# Patient Record
Sex: FEMALE | Race: BLACK OR AFRICAN AMERICAN | NOT HISPANIC OR LATINO | Employment: FULL TIME | URBAN - METROPOLITAN AREA
[De-identification: names, ages, dates, MRNs, and addresses within clinical notes are randomized per-mention and may not be internally consistent; named-entity substitution may affect disease eponyms.]

---

## 2023-03-27 ENCOUNTER — HOSPITAL ENCOUNTER (OUTPATIENT)
Dept: DATA CONVERSION | Facility: HOSPITAL | Age: 58
End: 2023-03-27
Attending: INTERNAL MEDICINE | Admitting: INTERNAL MEDICINE
Payer: COMMERCIAL

## 2023-03-27 DIAGNOSIS — Z12.11 ENCOUNTER FOR SCREENING FOR MALIGNANT NEOPLASM OF COLON: ICD-10-CM

## 2023-03-27 DIAGNOSIS — M25.511 PAIN IN RIGHT SHOULDER: ICD-10-CM

## 2023-03-27 DIAGNOSIS — I10 ESSENTIAL (PRIMARY) HYPERTENSION: ICD-10-CM

## 2023-03-27 DIAGNOSIS — K64.0 FIRST DEGREE HEMORRHOIDS: ICD-10-CM

## 2023-03-27 DIAGNOSIS — Z86.010 PERSONAL HISTORY OF COLONIC POLYPS: ICD-10-CM

## 2023-04-05 LAB
ALBUMIN (G/DL) IN SER/PLAS: 4.5 G/DL (ref 3.4–5)
ANION GAP IN SER/PLAS: 17 MMOL/L (ref 10–20)
CALCIUM (MG/DL) IN SER/PLAS: 10.2 MG/DL (ref 8.6–10.6)
CARBON DIOXIDE, TOTAL (MMOL/L) IN SER/PLAS: 25 MMOL/L (ref 21–32)
CHLORIDE (MMOL/L) IN SER/PLAS: 106 MMOL/L (ref 98–107)
CREATININE (MG/DL) IN SER/PLAS: 0.76 MG/DL (ref 0.5–1.05)
GFR FEMALE: 90 ML/MIN/1.73M2
GLUCOSE (MG/DL) IN SER/PLAS: 75 MG/DL (ref 74–99)
PHOSPHATE (MG/DL) IN SER/PLAS: 4 MG/DL (ref 2.5–4.9)
POTASSIUM (MMOL/L) IN SER/PLAS: 4.7 MMOL/L (ref 3.5–5.3)
SODIUM (MMOL/L) IN SER/PLAS: 143 MMOL/L (ref 136–145)
UREA NITROGEN (MG/DL) IN SER/PLAS: 14 MG/DL (ref 6–23)

## 2023-09-19 ENCOUNTER — OFFICE VISIT (OUTPATIENT)
Dept: PRIMARY CARE | Facility: CLINIC | Age: 58
End: 2023-09-19
Payer: COMMERCIAL

## 2023-09-19 VITALS
DIASTOLIC BLOOD PRESSURE: 98 MMHG | OXYGEN SATURATION: 98 % | WEIGHT: 211.9 LBS | BODY MASS INDEX: 33.19 KG/M2 | TEMPERATURE: 97.8 F | SYSTOLIC BLOOD PRESSURE: 193 MMHG | HEART RATE: 66 BPM

## 2023-09-19 DIAGNOSIS — I10 PRIMARY HYPERTENSION: ICD-10-CM

## 2023-09-19 DIAGNOSIS — G89.29 CHRONIC RIGHT SHOULDER PAIN: Primary | ICD-10-CM

## 2023-09-19 DIAGNOSIS — M25.511 CHRONIC RIGHT SHOULDER PAIN: Primary | ICD-10-CM

## 2023-09-19 DIAGNOSIS — M54.2 NECK PAIN ON RIGHT SIDE: ICD-10-CM

## 2023-09-19 DIAGNOSIS — E78.5 HYPERLIPIDEMIA, UNSPECIFIED HYPERLIPIDEMIA TYPE: ICD-10-CM

## 2023-09-19 PROCEDURE — 99203 OFFICE O/P NEW LOW 30 MIN: CPT | Performed by: STUDENT IN AN ORGANIZED HEALTH CARE EDUCATION/TRAINING PROGRAM

## 2023-09-19 PROCEDURE — 1036F TOBACCO NON-USER: CPT | Performed by: STUDENT IN AN ORGANIZED HEALTH CARE EDUCATION/TRAINING PROGRAM

## 2023-09-19 PROCEDURE — 3077F SYST BP >= 140 MM HG: CPT | Performed by: STUDENT IN AN ORGANIZED HEALTH CARE EDUCATION/TRAINING PROGRAM

## 2023-09-19 PROCEDURE — 3080F DIAST BP >= 90 MM HG: CPT | Performed by: STUDENT IN AN ORGANIZED HEALTH CARE EDUCATION/TRAINING PROGRAM

## 2023-09-19 RX ORDER — LIDOCAINE 50 MG/G
1 PATCH TOPICAL DAILY
Qty: 30 PATCH | Refills: 11 | Status: SHIPPED | OUTPATIENT
Start: 2023-09-19 | End: 2023-09-21 | Stop reason: SDUPTHER

## 2023-09-19 RX ORDER — LISINOPRIL 10 MG/1
10 TABLET ORAL DAILY
COMMUNITY
Start: 2023-06-18 | End: 2023-09-19 | Stop reason: SDUPTHER

## 2023-09-19 RX ORDER — ATORVASTATIN CALCIUM 20 MG/1
20 TABLET, FILM COATED ORAL DAILY
Qty: 90 TABLET | Refills: 3 | Status: SHIPPED | OUTPATIENT
Start: 2023-09-19 | End: 2023-09-21 | Stop reason: SDUPTHER

## 2023-09-19 RX ORDER — LISINOPRIL 10 MG/1
10 TABLET ORAL DAILY
Qty: 90 TABLET | Refills: 3 | Status: SHIPPED | OUTPATIENT
Start: 2023-09-19 | End: 2023-09-21 | Stop reason: SDUPTHER

## 2023-09-19 RX ORDER — CYCLOBENZAPRINE HCL 5 MG
5 TABLET ORAL NIGHTLY PRN
Qty: 30 TABLET | Refills: 0 | Status: SHIPPED | OUTPATIENT
Start: 2023-09-19 | End: 2023-09-21 | Stop reason: SDUPTHER

## 2023-09-19 RX ORDER — ATORVASTATIN CALCIUM 20 MG/1
20 TABLET, FILM COATED ORAL DAILY
COMMUNITY
Start: 2023-02-14 | End: 2023-09-19 | Stop reason: SDUPTHER

## 2023-09-19 NOTE — PATIENT INSTRUCTIONS
Dear Ms. Deng,     It was a pleasure getting to manage your care with you today.     Please check your blood pressure daily over the next week. If your blood pressure is consistently >150 please let us know.    Prescriptions:  We have sent medication prescriptions to your pharmacy on file. Please pick them up at your earliest convenience.     Referral:   We have provided you the below referrals. Please call to schedule referrals if no one has contacted you within 3 days.   1. Physical Therapy    Follow up Appointment: 8 weeks    Emergency  In the case of an emergency please call 911 or visit the Emergency Department immediately for evaluation.     We look forward to continuing your care here at our Clinic. Take Care.     Sincerely,   Tom Maddox MD

## 2023-09-19 NOTE — PROGRESS NOTES
Subjective   Patient ID: Annabel Deng is a 58 y.o. female with a history of HTN and HLD who presents to establish care and for evaluation of Neck Pain.    Having pain in the R shoulder and R neck. Pain was initially in the R shoulder, which is still bothering her, but now the neck is worse. Pain has been going on 2-3 months in the R neck now and feels like a Charley Horse. New pillow didn't help. Trying to do exercise/gentle stretching isn't helping, Tylenol isn't helping, hot and cold compresses isn't helping anymore. Tried ibuprofen as well which didn't help. Has never had muscle relaxants. Pain is more in the lateral neck than the midline. No headaches. No injuries to the neck, no history of back or spine surgeries. 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 No swelling in the arm or neck. Had a tooth removed in June, neck pain was already there when she had the surgery. Had the surgery for a failed filling. No fevers or chills. No weakness in the arm, no numbness or tingling in the hands or fingers, no changes in hand coordination.      Review of Systems   Eyes:  Negative for visual disturbance.   Respiratory:  Negative for shortness of breath.    Cardiovascular:  Negative for chest pain.   Musculoskeletal:  Positive for arthralgias and neck pain.   Neurological:  Negative for weakness, numbness and headaches.     Objective   BP (!) 193/98 (BP Location: Right arm, Patient Position: Sitting, BP Cuff Size: Adult)   Pulse 66   Temp 36.6 °C (97.8 °F) (Temporal)   Wt 96.1 kg (211 lb 14.4 oz)   SpO2 98%   BMI 33.19 kg/m²  Body mass index is 33.19 kg/m².    Physical Exam  Vitals reviewed.   Constitutional:       Appearance: Normal appearance.   HENT:      Head: Normocephalic and atraumatic.   Eyes:      Conjunctiva/sclera: Conjunctivae normal.   Neck:      Comments: Mild pain with flexion, no pain with extension. Significant pain with R lateral flexion, less pain w/ L  lateral flexion. Pain w/ R rotation, less pain w/ L rotation. No midline cervical tenderness. Mild R paracervical tenderness.  Cardiovascular:      Rate and Rhythm: Normal rate and regular rhythm.      Heart sounds: No murmur heard.     No friction rub. No gallop.   Pulmonary:      Effort: Pulmonary effort is normal. No respiratory distress.   Musculoskeletal:      Right shoulder: No swelling or deformity.      Left shoulder: No swelling or deformity.      Comments: No tenderness over the clavicle, AC joint, glenohumeral joint, bicipital groove, or scapula; mild tenderness over proximal biceps.    Full flexion and abduction of the bilateral shoulders, although with pain at peak of motion. Mildly limited internal rotation and pain with lift-off test on the R. Unable to complete empty can test d/t severe shoulder pain on the R with arm positioning. Negative impingement tests. 5/5 symmetric strength with shoulder abduction, elbow flexion and extension, forearm pronation and supination.    2+ radial pulses. Grossly intact sensation in the hands.   Skin:     General: Skin is warm and dry.   Neurological:      Mental Status: She is alert. Mental status is at baseline.   Psychiatric:         Behavior: Behavior normal.       Assessment/Plan   Problem List Items Addressed This Visit       Hypertension     -BP markedly elevated in office in the absence of symptoms  -patient reports BP is typically well controlled at home and that she never sees numbers like this  -refilled lisinopril 10 mg daily  -monitor BP closely at home and call the clinic for persistently elevated BP         Relevant Medications    lisinopril 10 mg tablet    Hyperlipidemia     -reviewed last lipid panel which showed significantly elevated total cholesterol of 233, LDL of 158  -refilled atorvastatin 20 mg daily  -given ASCVD risk of 22.8% consider dose increase at next visit         Relevant Medications    atorvastatin (Lipitor) 20 mg tablet    Shoulder  pain, right - Primary     -given limited internal rotation and inability to complete empty can test I suspect pain is d/t rotator cuff pathology  -shoulder XR to evaluate for alternative etiologies  -PT eval for further evaluation and management  -Tylenol, cyclobenzaprine PRN for pain         Relevant Medications    cyclobenzaprine (Flexeril) 5 mg tablet    lidocaine (Lidoderm) 5 % patch    Other Relevant Orders    XR shoulder right 2+ views (Completed)    Referral to Physical Therapy    Neck pain on right side     -pain appears to be muscular in nature given pain w/ certain neck motions and location of pain  -no distal neurovascular deficits  -no apparent bony pain or tenderness  -continue stretching, Tylenol, hot/warm compresses  -trial of cyclobenzaprine 5 mg TID PRN  -cervical spine x-ray         Relevant Medications    cyclobenzaprine (Flexeril) 5 mg tablet    lidocaine (Lidoderm) 5 % patch    Other Relevant Orders    XR cervical spine complete 4-5 views (Completed)    Referral to Physical Therapy     Follow up in 2 months for reassessment of pain and blood pressure.    Patient seen and discussed with Dr. Narayanan.    Tom Maddox MD   PGY-3  [facility]

## 2023-09-20 PROBLEM — E55.9 VITAMIN D DEFICIENCY: Status: ACTIVE | Noted: 2023-09-20

## 2023-09-20 PROBLEM — E66.9 OBESITY: Status: ACTIVE | Noted: 2023-09-20

## 2023-09-20 PROBLEM — N95.2 ATROPHY OF VAGINA: Status: ACTIVE | Noted: 2023-09-20

## 2023-09-20 ASSESSMENT — ENCOUNTER SYMPTOMS
ARTHRALGIAS: 1
SHORTNESS OF BREATH: 0
WEAKNESS: 0
NECK PAIN: 1
NUMBNESS: 0
HEADACHES: 0

## 2023-09-21 DIAGNOSIS — M54.2 NECK PAIN ON RIGHT SIDE: ICD-10-CM

## 2023-09-21 DIAGNOSIS — I10 PRIMARY HYPERTENSION: ICD-10-CM

## 2023-09-21 DIAGNOSIS — E78.5 HYPERLIPIDEMIA, UNSPECIFIED HYPERLIPIDEMIA TYPE: ICD-10-CM

## 2023-09-21 DIAGNOSIS — M25.511 CHRONIC RIGHT SHOULDER PAIN: ICD-10-CM

## 2023-09-21 DIAGNOSIS — G89.29 CHRONIC RIGHT SHOULDER PAIN: ICD-10-CM

## 2023-09-21 RX ORDER — CYCLOBENZAPRINE HCL 5 MG
5 TABLET ORAL NIGHTLY PRN
Qty: 30 TABLET | Refills: 0 | Status: SHIPPED | OUTPATIENT
Start: 2023-09-21 | End: 2023-09-25 | Stop reason: SDUPTHER

## 2023-09-21 RX ORDER — ATORVASTATIN CALCIUM 20 MG/1
20 TABLET, FILM COATED ORAL DAILY
Qty: 90 TABLET | Refills: 3 | Status: SHIPPED | OUTPATIENT
Start: 2023-09-21 | End: 2024-03-07 | Stop reason: SDUPTHER

## 2023-09-21 RX ORDER — LISINOPRIL 10 MG/1
10 TABLET ORAL DAILY
Qty: 90 TABLET | Refills: 3 | Status: SHIPPED | OUTPATIENT
Start: 2023-09-21 | End: 2024-03-07 | Stop reason: SDUPTHER

## 2023-09-21 RX ORDER — LIDOCAINE 50 MG/G
1 PATCH TOPICAL DAILY
Qty: 30 PATCH | Refills: 11 | Status: SHIPPED | OUTPATIENT
Start: 2023-09-21 | End: 2024-03-07 | Stop reason: SDUPTHER

## 2023-09-21 NOTE — ASSESSMENT & PLAN NOTE
-BP markedly elevated in office in the absence of symptoms  -patient reports BP is typically well controlled at home and that she never sees numbers like this  -refilled lisinopril 10 mg daily  -monitor BP closely at home and call the clinic for persistently elevated BP

## 2023-09-21 NOTE — ASSESSMENT & PLAN NOTE
-pain appears to be muscular in nature given pain w/ certain neck motions and location of pain  -no distal neurovascular deficits  -no apparent bony pain or tenderness  -continue stretching, Tylenol, hot/warm compresses  -trial of cyclobenzaprine 5 mg TID PRN  -lidocaine patches  -cervical spine x-ray

## 2023-09-21 NOTE — ASSESSMENT & PLAN NOTE
-reviewed last lipid panel which showed significantly elevated total cholesterol of 233, LDL of 158  -refilled atorvastatin 20 mg daily  -given ASCVD risk of 22.8% consider dose increase at next visit

## 2023-09-21 NOTE — ASSESSMENT & PLAN NOTE
-given limited internal rotation and inability to complete empty can test I suspect pain is d/t rotator cuff pathology  -shoulder XR to evaluate for alternative etiologies  -PT eval for further evaluation and management  -Tylenol, cyclobenzaprine PRN for pain

## 2023-09-21 NOTE — PROGRESS NOTES
Patient requested rxs sent to [facility] rather than [facility] where previously prescribed.  
No

## 2023-09-25 DIAGNOSIS — M25.511 CHRONIC RIGHT SHOULDER PAIN: ICD-10-CM

## 2023-09-25 DIAGNOSIS — M50.30 DEGENERATIVE DISC DISEASE, CERVICAL: ICD-10-CM

## 2023-09-25 DIAGNOSIS — G89.29 CHRONIC RIGHT SHOULDER PAIN: ICD-10-CM

## 2023-09-25 RX ORDER — CYCLOBENZAPRINE HCL 10 MG
10 TABLET ORAL NIGHTLY PRN
Qty: 30 TABLET | Refills: 0 | Status: SHIPPED | OUTPATIENT
Start: 2023-09-25 | End: 2023-11-24

## 2023-09-25 NOTE — PROGRESS NOTES
XR showed significant degenerative disc disease C5-7. Patient reporting persistent severe neck pain without radicular symptoms. Encouraged follow up with physical therapy. Will trial increased flexeril dose and also refer to pain management.

## 2023-10-17 NOTE — PROGRESS NOTES
I saw and evaluated the patient. I personally obtained the key and critical portions of the history and physical exam or was physically present for key and critical portions performed by the resident/fellow. I reviewed the resident/fellow's documentation and discussed the patient with the resident/fellow. I agree with the resident/fellow's medical decision making as documented in the note.    Charlette Narayanan MD

## 2023-10-19 ENCOUNTER — APPOINTMENT (OUTPATIENT)
Dept: PHYSICAL THERAPY | Facility: HOSPITAL | Age: 58
End: 2023-10-19
Payer: COMMERCIAL

## 2023-11-21 ENCOUNTER — APPOINTMENT (OUTPATIENT)
Dept: PRIMARY CARE | Facility: CLINIC | Age: 58
End: 2023-11-21
Payer: COMMERCIAL

## 2024-03-07 ENCOUNTER — OFFICE VISIT (OUTPATIENT)
Dept: PRIMARY CARE | Facility: HOSPITAL | Age: 59
End: 2024-03-07
Payer: COMMERCIAL

## 2024-03-07 VITALS
BODY MASS INDEX: 32.96 KG/M2 | SYSTOLIC BLOOD PRESSURE: 177 MMHG | OXYGEN SATURATION: 100 % | DIASTOLIC BLOOD PRESSURE: 109 MMHG | HEIGHT: 67 IN | TEMPERATURE: 97.7 F | HEART RATE: 67 BPM | WEIGHT: 210 LBS

## 2024-03-07 DIAGNOSIS — Z00.00 HEALTHCARE MAINTENANCE: ICD-10-CM

## 2024-03-07 DIAGNOSIS — G89.29 CHRONIC RIGHT SHOULDER PAIN: ICD-10-CM

## 2024-03-07 DIAGNOSIS — E78.49 OTHER HYPERLIPIDEMIA: ICD-10-CM

## 2024-03-07 DIAGNOSIS — R73.03 PREDIABETES: ICD-10-CM

## 2024-03-07 DIAGNOSIS — M25.511 CHRONIC RIGHT SHOULDER PAIN: ICD-10-CM

## 2024-03-07 DIAGNOSIS — M47.812 SPONDYLOSIS OF CERVICAL REGION WITHOUT MYELOPATHY OR RADICULOPATHY: Primary | ICD-10-CM

## 2024-03-07 DIAGNOSIS — I10 PRIMARY HYPERTENSION: ICD-10-CM

## 2024-03-07 DIAGNOSIS — M54.2 NECK PAIN ON RIGHT SIDE: ICD-10-CM

## 2024-03-07 DIAGNOSIS — M54.2 NECK PAIN: Primary | ICD-10-CM

## 2024-03-07 DIAGNOSIS — E78.5 HYPERLIPIDEMIA, UNSPECIFIED HYPERLIPIDEMIA TYPE: ICD-10-CM

## 2024-03-07 LAB
ALBUMIN SERPL BCP-MCNC: 4.6 G/DL (ref 3.4–5)
ANION GAP SERPL CALC-SCNC: 14 MMOL/L (ref 10–20)
BUN SERPL-MCNC: 12 MG/DL (ref 6–23)
CALCIUM SERPL-MCNC: 9.8 MG/DL (ref 8.6–10.6)
CHLORIDE SERPL-SCNC: 106 MMOL/L (ref 98–107)
CHOLEST SERPL-MCNC: 222 MG/DL (ref 0–199)
CHOLESTEROL/HDL RATIO: 4.3
CO2 SERPL-SCNC: 26 MMOL/L (ref 21–32)
CREAT SERPL-MCNC: 0.77 MG/DL (ref 0.5–1.05)
EGFRCR SERPLBLD CKD-EPI 2021: 89 ML/MIN/1.73M*2
EST. AVERAGE GLUCOSE BLD GHB EST-MCNC: 114 MG/DL
GLUCOSE SERPL-MCNC: 84 MG/DL (ref 74–99)
HBA1C MFR BLD: 5.6 %
HDLC SERPL-MCNC: 52.2 MG/DL
LDLC SERPL CALC-MCNC: 155 MG/DL
NON HDL CHOLESTEROL: 170 MG/DL (ref 0–149)
PHOSPHATE SERPL-MCNC: 3.8 MG/DL (ref 2.5–4.9)
POTASSIUM SERPL-SCNC: 4.4 MMOL/L (ref 3.5–5.3)
SODIUM SERPL-SCNC: 142 MMOL/L (ref 136–145)
TRIGL SERPL-MCNC: 75 MG/DL (ref 0–149)
VLDL: 15 MG/DL (ref 0–40)

## 2024-03-07 PROCEDURE — 80061 LIPID PANEL: CPT

## 2024-03-07 PROCEDURE — 80069 RENAL FUNCTION PANEL: CPT

## 2024-03-07 PROCEDURE — 3077F SYST BP >= 140 MM HG: CPT

## 2024-03-07 PROCEDURE — 99214 OFFICE O/P EST MOD 30 MIN: CPT | Mod: GC

## 2024-03-07 PROCEDURE — 3080F DIAST BP >= 90 MM HG: CPT

## 2024-03-07 PROCEDURE — 83036 HEMOGLOBIN GLYCOSYLATED A1C: CPT

## 2024-03-07 PROCEDURE — 99214 OFFICE O/P EST MOD 30 MIN: CPT

## 2024-03-07 PROCEDURE — 1036F TOBACCO NON-USER: CPT

## 2024-03-07 PROCEDURE — 36415 COLL VENOUS BLD VENIPUNCTURE: CPT

## 2024-03-07 RX ORDER — LIDOCAINE 50 MG/G
1 PATCH TOPICAL DAILY
Qty: 30 PATCH | Refills: 11 | Status: SHIPPED | OUTPATIENT
Start: 2024-03-07 | End: 2024-03-07 | Stop reason: ALTCHOICE

## 2024-03-07 RX ORDER — ACETAMINOPHEN 500 MG
1000 TABLET ORAL EVERY 6 HOURS PRN
Qty: 90 TABLET | Refills: 3 | Status: SHIPPED | OUTPATIENT
Start: 2024-03-07 | End: 2024-04-21

## 2024-03-07 RX ORDER — ATORVASTATIN CALCIUM 20 MG/1
20 TABLET, FILM COATED ORAL DAILY
Qty: 90 TABLET | Refills: 11 | Status: SHIPPED | OUTPATIENT
Start: 2024-03-07 | End: 2024-03-11 | Stop reason: SDUPTHER

## 2024-03-07 RX ORDER — LIDOCAINE 50 MG/G
1 PATCH TOPICAL DAILY
Qty: 30 PATCH | Refills: 3 | Status: SHIPPED | OUTPATIENT
Start: 2024-03-07 | End: 2025-03-07

## 2024-03-07 RX ORDER — LISINOPRIL 10 MG/1
20 TABLET ORAL DAILY
Qty: 90 TABLET | Refills: 11 | Status: SHIPPED | OUTPATIENT
Start: 2024-03-07 | End: 2024-05-07 | Stop reason: SDUPTHER

## 2024-03-07 ASSESSMENT — ENCOUNTER SYMPTOMS
APPETITE CHANGE: 0
HEMATURIA: 0
CONSTIPATION: 0
JOINT SWELLING: 0
COUGH: 0
DEPRESSION: 0
LIGHT-HEADEDNESS: 0
CHOKING: 0
NECK PAIN: 1
FATIGUE: 0
ABDOMINAL DISTENTION: 0
ARTHRALGIAS: 1
RHINORRHEA: 0
LOSS OF SENSATION IN FEET: 0
FEVER: 0
HEADACHES: 0
BRUISES/BLEEDS EASILY: 0
ACTIVITY CHANGE: 0
OCCASIONAL FEELINGS OF UNSTEADINESS: 0
DIFFICULTY URINATING: 0
SLEEP DISTURBANCE: 0
EYE DISCHARGE: 0
DIARRHEA: 0

## 2024-03-07 ASSESSMENT — PATIENT HEALTH QUESTIONNAIRE - PHQ9
2. FEELING DOWN, DEPRESSED OR HOPELESS: NOT AT ALL
SUM OF ALL RESPONSES TO PHQ9 QUESTIONS 1 AND 2: 0
1. LITTLE INTEREST OR PLEASURE IN DOING THINGS: NOT AT ALL

## 2024-03-07 ASSESSMENT — PAIN SCALES - GENERAL: PAINLEVEL: 0-NO PAIN

## 2024-03-07 NOTE — PROGRESS NOTES
Subjective   Patient ID: Annabel Deng is a 58 y.o. female who presents for No chief complaint on file..  HPI    Last seen [facility] [place], [place] 4/2023  Interval hx:  seen by family med 9/2023 for R neck pain, also on R shoulder, shoulder XR, PT eval, tyenol, cyclobenzaprine 5 mg TID PRN, /98    Radiograhs showing moderate degenerative changes of cervical spine and normal shoulder radiograph, FM later increased Flexeril to 10 mg TID PRN    Still has neck, L knee and L hip and some R shoulder. Neck pain is worst. Thinks it is arthritis. Tried voltaren gel, didn't do anything. Taking Tylenol PM and helps. Maybe twice per week. Nothing else for pain, Rare ibuprofen (2 tabs twice per week). No flexeril or lidocaine patch. Physical therapy. They gave a referral previously.   Neck pain no radiation into arm. No injuries. Has neck brace that helps. No chiropractor but does get massages  9/10 at its worst in neck, hip and knee is 5-6/10. No sciatica. BP high, last took 1 month ago, No CP, blurry vision, headache or blood in urine. Used to have home BP cuff but lost it.   No paresthesias    ROS  No fevers, has eye doctor no blurry vision  No difficulty swallowing  Had tooth pulled, has dentist  No cough, nbo SOB  No CP  No constipation or diarrhea  No urinary issues  Last period 5 years ago, no bleeding  Psych: No stress or anxiety    No ED or urgent care visits        BP - will get cuff for home does not have presently  Diet: eats once per day, gets to work 0500, sleeps by 8 PM, eats dinner 5 PM, air frier, typical meal - salad, wings, vegetables, french fries, likes oreos, drinks some ginger ale, regular sweet tea and water, some coffee, just not hungry, weight has been stable, wants to reduce sugary beverage consumption  Exercise: busy moving, not exercising as regularly, walking is the plan once done moving, stretching  Weight has been stable, pt interested in losing weight    Meds: Lisinopril 10 mg, atorvastatin 20 mg, needs  refills  [facility]          Review of Systems   Constitutional:  Negative for activity change, appetite change, fatigue and fever.   HENT:  Negative for congestion, dental problem and rhinorrhea.    Eyes:  Negative for discharge and visual disturbance.   Respiratory:  Negative for cough and choking.    Cardiovascular:  Negative for chest pain.   Gastrointestinal:  Negative for abdominal distention, constipation and diarrhea.   Endocrine: Negative for polyuria.   Genitourinary:  Negative for decreased urine volume, difficulty urinating and hematuria.   Musculoskeletal:  Positive for arthralgias and neck pain. Negative for joint swelling.   Skin:  Negative for rash.   Neurological:  Negative for light-headedness and headaches.   Hematological:  Does not bruise/bleed easily.   Psychiatric/Behavioral:  Negative for sleep disturbance.          Past Medical History:   Diagnosis Date    Costochondritis 2013    Osteoarthritis of hip, unspecified     Osteoarthritis of hip    Ovarian cyst 2013    Polyp of cervix uteri 2017    Cervical polyp     Past Surgical History:   Procedure Laterality Date     SECTION, CLASSIC  2013     Section    CT ABDOMEN PELVIS ANGIOGRAM W AND/OR WO IV CONTRAST  2016    CT ABDOMEN PELVIS ANGIOGRAM W AND/OR WO IV CONTRAST 2016 [facility] [place] [facility]    TUBAL LIGATION  2013    Tubal Ligation     No family history on file.  Medication Documentation Review Audit       Reviewed by Tom Maddox MD (Resident) on 23 at 1552      Medication Order Taking? Sig Documenting Provider Last Dose Status            No Medications to Display                                 No Known Allergies  Social Determinants of Health     Tobacco Use: Low Risk  (2023)    Patient History     Smoking Tobacco Use: Never     Smokeless Tobacco Use: Never     Passive Exposure: Not on file   Alcohol Use: Not on file   Financial Resource Strain: Not  on file   Food Insecurity: Not on file   Transportation Needs: Not on file   Physical Activity: Not on file   Stress: Not on file   Social Connections: Not on file   Intimate Partner Violence: Not on file   Depression: Not on file   Housing Stability: Not on file   Utilities: Not on file   Digital Equity: Not on file       Objective   There were no vitals taken for this visit.   Physical Exam    Gen: NAD, alert and oriented, resting comfortably  HEENT: PERRL, EOMI, no oral lesions, MMM  Neck: supple, no masses, limited ROM of neck to R, negative spurling  Cardiac: RRR. No murmurs, rubs, or gallops, no JVD  Resp: CTA b/l, no rales or ronchi  Abd: soft, nontender to palpation, no HSM, normal bowel sounds  MSK: Strength and ROM grossly intact b/l, no joint erythema, neg b/l straight leg raise, neg empty can and neg lift off test  Neuro: CN 2-12 intact b/l, sensation to light touch intact  Skin: No rash or bruising  Lymph: No edema  Vascular: Radial and pedal pulses 2+ b/l  Psych: Normal mood and affect        No results found for this or any previous visit (from the past 24 hour(s)).       Assessment/Plan            Annabel Deng 58 F w/ Hx of HTN, DLD, and prediabetes presenting today for follow up. Major concerns today are untreated hypertension and osteoarthritis. Blood pressure severely elevated but no signs of end organ damage as pt ran out of Lisinopril and has not been taking. Will resume anti hypertensive and encourage ambulatory monitoring and DASH diet initiation. Regarding OA, no red flag symptoms. Will recommend high dose tylenol and PT which pt is agreeable to.         #Hypertension stage II  -resume lisinopril and increase to 20 mg QD  -DASH diet counseling continued  -Pt counseled on home BP cuff, plans to commence using  -RFP to assess for kidney complications from sustained HTN     #R shoulder pain, neck pain, L knee pain, L hip pain  #Osteoarthritis  -Likely MSK in nature, radiographs showing  degenerative changes of neck  -Advised high dose Tylenol 1 g Q6 scheduled for 10 days then as needed, lidocaine patch  -PT referral, acupuncture referral, pt has existing pain management referral from FM  -Counseled against NSAID use     #HDL  -last lipid Feb 2023 (t cholesterol 233, HDL 58 and ). ASCVD 11%  -Atorvastatin 20 mg  -Recheck ordered    #Prediabetes  -A1c 5.7 Feb 2023  -recheck ordered        #Health maintenance  -A1c: 5.7 (Feb 2023)  -Lipid: cholesterol 197, HDL 57, ,  2014   -Colonoscopy: 8/2014 one sessile polyp removed â€“ tubular adenoma, internal hemorrhoid, repeat 5-10 years, repeat March 202 normal, no biopsies taken  -Mammogram: Feb 2023 neg, reordered 3/2024  -Pap: March 2023 HPV neg,   -Dental home referral provided  -Immunizations: Covid x2, no booster, no shingles vaccine, no flu vaccine (pt does not like needles and prefers to pass for now), Last TDaP 2017    Staffed with Dr. Smith  Follow up: 6 weeks    Signed,  Tushar Duran MD PGY-2  Internal Medicine-Pediatrics

## 2024-03-07 NOTE — PATIENT INSTRUCTIONS
Dear  Annabel,    Thank you for choosing [facility] for your care needs. It was a pleasure to serve you. Today, we discussed the following:    Blood pressure: Your blood pressure is very high today. It is important that you resume taking your lisinopril 20 mg ASAP. We will call it in to your pharmacy. Please also start using your home blood pressure cuff and check BP 3x per week. Goal BP is less than 130/80. Lowering blood pressure is important to reduce risk heart attack and stroke  Do not take NSAIDS like aleve and motrin as these raise blood pressure. We recommend starting the DASH diet to reduce BP and giving you a hand out    We refilled your medication for high cholesterol Atorvastatin and will recheck cholesterol level and diabetes level and electrolytes    For neck, hip and knee pain this is likely arthritis. To treat we use antiinflammatory medication (start Tylenol 1g every 6 hours (max 4,000 mg per day) scheduled for 10 days as a burst tor educe inflammation. Also sending lidocaine patch to pharmacy. The second way to address this is to strengthen at risk muscle groups with physical therapy. They will teach you the exercises and then you can do them at home. We will also refer you to [facility] medicine as this can help with pain (acupuncture).  You also have a referral from pain management from Family Medicine if you would like to see them.       Health maintenance:  We offered vaccines today but you preferred to wait  Mammogram ordered    Follow up:  6 weeks with Dr Tushar Duran (between May 1 until May 14)    Thank you so much for coming to the clinic today. It was very nice to meet you. If you have any questions please call our office at [phone] to talk to one of our physicians or schedule an appointment     Tushar Duran MD  Internal Medicine/Pediatrics, PGY-2

## 2024-03-07 NOTE — PROGRESS NOTES
I reviewed the resident/fellow's documentation and discussed the patient with the resident/fellow. I agree with the resident/fellow's medical decision making as documented in the note.     Segundo Smith MD MPH

## 2024-03-08 ENCOUNTER — SPECIALTY PHARMACY (OUTPATIENT)
Dept: PHARMACY | Facility: CLINIC | Age: 59
End: 2024-03-08

## 2024-03-11 ENCOUNTER — TELEPHONE (OUTPATIENT)
Dept: PEDIATRICS | Facility: CLINIC | Age: 59
End: 2024-03-11
Payer: COMMERCIAL

## 2024-03-11 DIAGNOSIS — E78.5 HYPERLIPIDEMIA, UNSPECIFIED HYPERLIPIDEMIA TYPE: ICD-10-CM

## 2024-03-11 RX ORDER — ATORVASTATIN CALCIUM 20 MG/1
40 TABLET, FILM COATED ORAL DAILY
Qty: 90 TABLET | Refills: 11 | Status: SHIPPED | OUTPATIENT
Start: 2024-03-11 | End: 2024-05-07 | Stop reason: SDUPTHER

## 2024-03-11 NOTE — TELEPHONE ENCOUNTER
Called pt on lab results. A1c improved to 5.6%. RFP stable. Only mild gains on lipid panel on Atorvastatin 20 mg., LDL above goal.   Asked pt to increase Atorvastatin to 40 mg. Will send refill to pharmacy  Lidocaine patch available at [facility] without copay.    Pt agreed to plan. Will also continue to work on lifestyle changes. No additional concerns      Signed,  Tushar Duran MD PGY-2  Internal Medicine-Pediatrics

## 2024-03-13 ENCOUNTER — HOSPITAL ENCOUNTER (OUTPATIENT)
Dept: RADIOLOGY | Facility: HOSPITAL | Age: 59
Discharge: HOME | End: 2024-03-13
Payer: COMMERCIAL

## 2024-03-13 VITALS — HEIGHT: 67 IN | WEIGHT: 210 LBS | BODY MASS INDEX: 32.96 KG/M2

## 2024-03-13 DIAGNOSIS — Z00.00 HEALTHCARE MAINTENANCE: ICD-10-CM

## 2024-03-13 PROCEDURE — 77067 SCR MAMMO BI INCL CAD: CPT

## 2024-03-13 PROCEDURE — 77063 BREAST TOMOSYNTHESIS BI: CPT | Performed by: RADIOLOGY

## 2024-03-13 PROCEDURE — 77067 SCR MAMMO BI INCL CAD: CPT | Performed by: RADIOLOGY

## 2024-03-18 ENCOUNTER — TELEPHONE (OUTPATIENT)
Dept: PEDIATRICS | Facility: CLINIC | Age: 59
End: 2024-03-18
Payer: COMMERCIAL

## 2024-03-18 NOTE — TELEPHONE ENCOUNTER
Called pt and updated on negative mammogram result. Pt said she already saw the result on Mychart. No questions at this time.    Signed,  Tushar Duran MD PGY-2  Internal Medicine-Pediatrics

## 2024-05-07 ENCOUNTER — OFFICE VISIT (OUTPATIENT)
Dept: PRIMARY CARE | Facility: HOSPITAL | Age: 59
End: 2024-05-07
Payer: COMMERCIAL

## 2024-05-07 VITALS
DIASTOLIC BLOOD PRESSURE: 91 MMHG | TEMPERATURE: 96.6 F | OXYGEN SATURATION: 99 % | HEIGHT: 67 IN | BODY MASS INDEX: 32.49 KG/M2 | HEART RATE: 70 BPM | WEIGHT: 207 LBS | SYSTOLIC BLOOD PRESSURE: 160 MMHG

## 2024-05-07 DIAGNOSIS — E78.5 HYPERLIPIDEMIA, UNSPECIFIED HYPERLIPIDEMIA TYPE: ICD-10-CM

## 2024-05-07 DIAGNOSIS — I10 PRIMARY HYPERTENSION: ICD-10-CM

## 2024-05-07 PROCEDURE — 99214 OFFICE O/P EST MOD 30 MIN: CPT

## 2024-05-07 PROCEDURE — 3080F DIAST BP >= 90 MM HG: CPT

## 2024-05-07 PROCEDURE — 99214 OFFICE O/P EST MOD 30 MIN: CPT | Mod: GC

## 2024-05-07 PROCEDURE — 1036F TOBACCO NON-USER: CPT

## 2024-05-07 PROCEDURE — 3077F SYST BP >= 140 MM HG: CPT

## 2024-05-07 RX ORDER — ATORVASTATIN CALCIUM 20 MG/1
40 TABLET, FILM COATED ORAL DAILY
Qty: 90 TABLET | Refills: 11 | Status: SHIPPED | OUTPATIENT
Start: 2024-05-07

## 2024-05-07 RX ORDER — LISINOPRIL 10 MG/1
40 TABLET ORAL DAILY
Qty: 90 TABLET | Refills: 11 | Status: SHIPPED | OUTPATIENT
Start: 2024-05-07

## 2024-05-07 ASSESSMENT — ENCOUNTER SYMPTOMS
DIARRHEA: 0
FEVER: 0
DIFFICULTY URINATING: 0
EYE DISCHARGE: 0
BACK PAIN: 1
SHORTNESS OF BREATH: 0
COUGH: 0
LOSS OF SENSATION IN FEET: 0
RHINORRHEA: 0
OCCASIONAL FEELINGS OF UNSTEADINESS: 0
DIZZINESS: 0
POLYDIPSIA: 0
BRUISES/BLEEDS EASILY: 0
DEPRESSION: 0
ARTHRALGIAS: 1
ABDOMINAL PAIN: 0

## 2024-05-07 ASSESSMENT — PATIENT HEALTH QUESTIONNAIRE - PHQ9
SUM OF ALL RESPONSES TO PHQ9 QUESTIONS 1 AND 2: 0
2. FEELING DOWN, DEPRESSED OR HOPELESS: NOT AT ALL
1. LITTLE INTEREST OR PLEASURE IN DOING THINGS: NOT AT ALL

## 2024-05-07 ASSESSMENT — PAIN SCALES - GENERAL: PAINLEVEL: 6
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
## 2024-08-06 ENCOUNTER — TELEPHONE (OUTPATIENT)
Dept: PRIMARY CARE | Facility: HOSPITAL | Age: 59
End: 2024-08-06
Payer: COMMERCIAL

## 2024-08-06 NOTE — TELEPHONE ENCOUNTER
Received a box task that pt would like to speak with provider regarding her prescriptions. I tried calling her, unable to reach her. Will forward to next box doctor to follow-up.

## 2024-08-07 NOTE — PROGRESS NOTES
Received in-basket task for patient question about her medications. Called patient to discuss. No answer, left HIPAA compliant VM to call back during business hours.

## 2024-08-08 NOTE — TELEPHONE ENCOUNTER
Received a box task that pt would like to speak with provider regarding her prescriptions. Pt answered but said she was at work and unable to talk and that she would call later.

## 2024-11-05 ENCOUNTER — APPOINTMENT (OUTPATIENT)
Dept: PRIMARY CARE | Facility: HOSPITAL | Age: 59
End: 2024-11-05
Payer: COMMERCIAL

## 2025-04-14 ENCOUNTER — OFFICE VISIT (OUTPATIENT)
Dept: PRIMARY CARE | Facility: HOSPITAL | Age: 60
End: 2025-04-14
Payer: COMMERCIAL

## 2025-04-14 VITALS
HEIGHT: 67 IN | WEIGHT: 205 LBS | DIASTOLIC BLOOD PRESSURE: 95 MMHG | BODY MASS INDEX: 32.18 KG/M2 | SYSTOLIC BLOOD PRESSURE: 175 MMHG | TEMPERATURE: 98.7 F | HEART RATE: 77 BPM | OXYGEN SATURATION: 96 %

## 2025-04-14 DIAGNOSIS — G89.29 CHRONIC RIGHT SHOULDER PAIN: ICD-10-CM

## 2025-04-14 DIAGNOSIS — R40.0 DAYTIME SOMNOLENCE: ICD-10-CM

## 2025-04-14 DIAGNOSIS — I10 PRIMARY HYPERTENSION: ICD-10-CM

## 2025-04-14 DIAGNOSIS — Z00.00 HEALTHCARE MAINTENANCE: ICD-10-CM

## 2025-04-14 DIAGNOSIS — E78.5 HYPERLIPIDEMIA, UNSPECIFIED HYPERLIPIDEMIA TYPE: ICD-10-CM

## 2025-04-14 DIAGNOSIS — E55.9 MILD VITAMIN D DEFICIENCY: Primary | ICD-10-CM

## 2025-04-14 DIAGNOSIS — M25.511 CHRONIC RIGHT SHOULDER PAIN: ICD-10-CM

## 2025-04-14 DIAGNOSIS — R73.03 PREDIABETES: ICD-10-CM

## 2025-04-14 DIAGNOSIS — Z12.83 SKIN CANCER SCREENING: ICD-10-CM

## 2025-04-14 RX ORDER — ATORVASTATIN CALCIUM 40 MG/1
40 TABLET, FILM COATED ORAL DAILY
Qty: 100 TABLET | Refills: 3 | Status: SHIPPED | OUTPATIENT
Start: 2025-04-14 | End: 2026-05-19

## 2025-04-14 RX ORDER — LOSARTAN POTASSIUM AND HYDROCHLOROTHIAZIDE 12.5; 5 MG/1; MG/1
1 TABLET ORAL DAILY
Qty: 30 TABLET | Refills: 11 | Status: SHIPPED | OUTPATIENT
Start: 2025-04-14 | End: 2026-04-14

## 2025-04-14 ASSESSMENT — PATIENT HEALTH QUESTIONNAIRE - PHQ9
1. LITTLE INTEREST OR PLEASURE IN DOING THINGS: NOT AT ALL
SUM OF ALL RESPONSES TO PHQ9 QUESTIONS 1 AND 2: 0
2. FEELING DOWN, DEPRESSED OR HOPELESS: NOT AT ALL

## 2025-04-14 ASSESSMENT — PAIN SCALES - GENERAL: PAINLEVEL_OUTOF10: 2

## 2025-04-14 ASSESSMENT — ENCOUNTER SYMPTOMS
LOSS OF SENSATION IN FEET: 0
DEPRESSION: 0
OCCASIONAL FEELINGS OF UNSTEADINESS: 0

## 2025-04-14 NOTE — PATIENT INSTRUCTIONS
Dear  Ms Ish,    Thank you for choosing [facility] for your care needs. It was a pleasure to serve you. Today, we discussed the following:  Blood pressure is uncontrolled. Please resume lisinopril 40 mg and check your BP three times per week and send me a my chart message with the values  We will check your electrolytes and kidney function today. We will also check in 2 weeks (future lab order placed)  We also placed an order for a sleep apnea test at home.    Cholesterol is not well controlled. Please resume your Atorvastatin and we will recheck lipids. We placed order for CT calcium scoring to see if you need the statin based on looking at your heart arteries    Diabetes screen today  Vitamin D screening and throid screening    Arthritis - Physical therapy referral, Can do tylenol and volatren for pain. Please avoid ibuprofen as can raise BP    Mammogram ordered    Keep up the good work with diet and exercise! Your weight is down this visit    Dermatology for skin check      Referrals:  you were referred to see the following specialists: physical therapy and dermatology You should receive a call from central scheduling in the next few days if you do not receive a call within 3-5 business days please call [phone] to schedule your appointment. You can also schedule on the [employer] tl for smartphones.     Health maintenance: mammogram as above      Follow up: 2 weeks for nurse visit for BP check and 6 weeks Dr Tushar Duran      Thank you so much for coming to the clinic today. It was very nice to meet you. If you have any questions please call our office at [phone] to talk to one of our physicians or schedule an appointment. Our fax number is [phone]. If your issue cannot wait until the next business day, please go to urgent care or the emergency department.     Tushar Duran MD  Internal Medicine/Pediatrics, PGY-2 resident physician      If You smoke or use other tobacco products,  take steps to quit. Call [phone] for more information or to set up an appointment with  [facility]. The [place] Tobacco Quit Line is a free resource for people who don’t have insurance, receive Medicaid, pregnant women, or members of the [place] [facility]. Call 7-451-QUIT-NOW or 1-409.687.6875.    I also strongly urge all of my patients to register for [employer] [facility] by going to: [email or web address]  (The  staff can also send you a text/email link to register when you check out).

## 2025-04-14 NOTE — PROGRESS NOTES
Subjective   Patient ID: Annabel Deng is a 60 y.o. female who presents for Annual Exam.  HPI    Annabel Deng 60 yr F 2:15 PM follow up - BP not at goal, increased lisinopril to 40 mg, statin increased to atorva 40 mg    Last seen 2024   Off meds for past month, been lazy, has not run out, leaving house 03:30 AM for work, thinking she will start take at night    BP? - home BP cuff? Lasty was 160 systolic, today BP is 174/102, recheck /95 on manual  Does have home cuff, has not been taking  -no HA no blurry vision no CP (occasional flutter couple seconds), no blood in urine    Mom with breast cancer, dx in 65, cousin with breast cancer, maybe in 40s or 50s, grandfather with throat cancer (smoked)    94 kg last time, weight 93 kg, doing exercise bike and house work consistently  Diet - smaller portions, eats once per day with nibbling at night, drinks low sugar drinks, likes tea with some sugar -lemon syd    Pain in L thumb, L knee and R shoulder, -THC potato chip did help  Pain 2/10  Taking some ibuprofen (2-3x per week)  Has been taking tylenol, pain sometimes worse at night. Tried Voltaren gel not much benefit  Soaks in tub with good benefit  Up for physical therapy  Doing exercise bike    Lump on head -5-6 months, hard to say if increasing in size, size of pea, not painful (sometimes a little tender), no drainage, no creams, no change if bear down    Vaccines - pt declines all vaccines    Sister and brother with sleep apnea, has never been told she snores    No falls, misses a step now and then    Review of Systems  Neg except as above      Past Medical History:   Diagnosis Date    Costochondritis 2013    Osteoarthritis of hip, unspecified     Osteoarthritis of hip    Ovarian cyst 2013    Polyp of cervix uteri 2017    Cervical polyp     Past Surgical History:   Procedure Laterality Date     SECTION, CLASSIC  2013     Section    CT ABDOMEN PELVIS ANGIOGRAM W  AND/OR WO IV CONTRAST  12/6/2016    CT ABDOMEN PELVIS ANGIOGRAM W AND/OR WO IV CONTRAST 12/6/2016 [facility] [place] [facility]    TUBAL LIGATION  07/30/2013    Tubal Ligation     Family History   Problem Relation Name Age of Onset    Breast cancer Mother  73     Medication Documentation Review Audit       Reviewed by Tom Maddox MD (Resident) on 09/19/23 at 1552      Medication Order Taking? Sig Documenting Provider Last Dose Status            No Medications to Display                                 Not on File  Social Drivers of Health     Tobacco Use: Low Risk  (4/14/2025)    Patient History     Smoking Tobacco Use: Never     Smokeless Tobacco Use: Never     Passive Exposure: Not on file   Alcohol Use: Not on file   Financial Resource Strain: Not on file   Food Insecurity: Not on file   Transportation Needs: Not on file   Physical Activity: Not on file   Stress: Not on file   Social Connections: Not on file   Intimate Partner Violence: Not on file   Depression: Not at risk (4/14/2025)    PHQ-2     PHQ-2 Score: 0   Housing Stability: Not on file   Utilities: Not on file   Digital Equity: Not on file   Health Literacy: Not on file       Objective   Visit Vitals  BP (!) 174/102   Pulse 77   Temp 37.1 °C (98.7 °F) (Temporal)      Physical Exam    Gen: NAD, alert and oriented, resting comfortably  HEENT: PERRL, EOMI, no oral lesions, MMM, small papule 2 mm on head, no crusting or rolled edges or scale  Neck: supple, no masses  Cardiac: RRR. No murmurs, rubs, or gallops  Resp: CTA b/l, no rales or ronchi  Abd: soft, nontender to palpation, no HSM, normal bowel sounds  MSK: Strength and ROM grossly intact b/l, normal ROM of L wrist and L knee, neg tinnel's sign  Neuro: CN 2-12 intact b/l, sensation to light touch intact  Skin: No rash or bruising  Lymph: No edema  Vascular: Radial and pedal pulses 2+ b/l  Psych: Normal mood and affect        No results found for this or any previous visit (from the past 24 hours).        Assessment/Plan          Annabel Deng 60 y F w/ Hx of HTN, DLD, OA, and prediabetes presenting today for follow up. BP non controlled today, will restart antihypertensive medication and encourage her to check BP at home. Ordered labs and TERRI testing. Encouraged restart statin, CT calcium score ordered to better risk stratify. Derm referral for skin lesion on head. Otherwise doing well from a health maintenance standpoint. Continues to defer routine vaccinations given personal preferences.      #Hypertension stage II   -No signs of end organ damage in clinic today  -Switched from Lisinopril 40 to losartan hydrochlorothiazide 50-12.5 for ease of taking and need for additional agent  -DASH diet counseling continued   -Repeat RFP and Mg ordered, TSH; repeat RFP ordered 2 weeks after start ARB  -Home sleep apnea test ordered    #Head papule  -No signs of precancerous lesion on exam, no signs infection  -Derm referral    #R shoulder pain, neck pain, L knee pain, L wrist pain  #Osteoarthritis   -Likely MSK in nature, radiographs showing degenerative changes of neck   -continue tylenol, lidocaine patch as needed   -PT ordered  -Counseled against NSAID use     #HLD   -last lipid Feb 2023 (t cholesterol 233, HDL 58 and ). ASCVD 11%   March 2024 lipid: T cholesterol 222, HDL 52  ASCVD 14.3%   -Plan: Atorvastatin 40 mg, repeat lipids, CT Ca scoring ordered  -CMP ordered on statin and to assess for LFT dysfunction 2/2 steatosis    #Prediabetes  -Was 5.7%, now A1c 5.6 March 2024   -Recheck ordered A1c and urine microalbumin:Cr     #Health maintenance   -A1c: 5.6 (March 2024)   -Lipid: cholesterol T cholesterol 222, HDL 52  ASCVD (March 2024)   -Colonoscopy: 8/2014 one sessile polyp removed â€“ tubular adenoma, internal hemorrhoid, repeat 5-10 years, repeat March 2023 normal, no biopsies taken, repeat 7-10 years   -Mammogram: 3/2024 - unremarkable, repeat ordered  -Pap: March 2023 HPV neg,    -Dental  home referral provided   -Immunizations: Covid x2, no booster, no shingles vaccine, no flu vaccine (pt does not like needles and prefers to pass for now), Last TDaP 2017, RSV (prefers not to receive)   -Vit D level ordered    Staffed with Dr. Everett  Follow up: 2 week nurse visit and then 6 weeks in person Tushar Beebe MD PGY-3  Internal Medicine-Pediatrics

## 2025-04-15 ENCOUNTER — TELEPHONE (OUTPATIENT)
Dept: PRIMARY CARE | Facility: HOSPITAL | Age: 60
End: 2025-04-15
Payer: COMMERCIAL

## 2025-04-15 DIAGNOSIS — E55.9 VITAMIN D DEFICIENCY: Primary | ICD-10-CM

## 2025-04-15 LAB
25(OH)D3+25(OH)D2 SERPL-MCNC: 11 NG/ML (ref 30–100)
ALBUMIN SERPL-MCNC: 4.5 G/DL (ref 3.6–5.1)
ALBUMIN/CREAT UR: 6 MG/G CREAT
ALP SERPL-CCNC: 99 U/L (ref 37–153)
ALT SERPL-CCNC: 14 U/L (ref 6–29)
ANION GAP SERPL CALCULATED.4IONS-SCNC: 10 MMOL/L (CALC) (ref 7–17)
AST SERPL-CCNC: 18 U/L (ref 10–35)
BASOPHILS # BLD AUTO: 33 CELLS/UL (ref 0–200)
BASOPHILS NFR BLD AUTO: 0.4 %
BILIRUB SERPL-MCNC: 0.5 MG/DL (ref 0.2–1.2)
BUN SERPL-MCNC: 12 MG/DL (ref 7–25)
CALCIUM SERPL-MCNC: 9.8 MG/DL (ref 8.6–10.4)
CHLORIDE SERPL-SCNC: 104 MMOL/L (ref 98–110)
CHOLEST SERPL-MCNC: 236 MG/DL
CHOLEST/HDLC SERPL: 3.7 (CALC)
CO2 SERPL-SCNC: 27 MMOL/L (ref 20–32)
CREAT SERPL-MCNC: 0.76 MG/DL (ref 0.5–1.05)
CREAT UR-MCNC: 200 MG/DL (ref 20–275)
EGFRCR SERPLBLD CKD-EPI 2021: 89 ML/MIN/1.73M2
EOSINOPHIL # BLD AUTO: 98 CELLS/UL (ref 15–500)
EOSINOPHIL NFR BLD AUTO: 1.2 %
ERYTHROCYTE [DISTWIDTH] IN BLOOD BY AUTOMATED COUNT: 13 % (ref 11–15)
EST. AVERAGE GLUCOSE BLD GHB EST-MCNC: 123 MG/DL
EST. AVERAGE GLUCOSE BLD GHB EST-SCNC: 6.8 MMOL/L
GLUCOSE SERPL-MCNC: 84 MG/DL (ref 65–99)
HBA1C MFR BLD: 5.9 %
HCT VFR BLD AUTO: 40.3 % (ref 35–45)
HDLC SERPL-MCNC: 63 MG/DL
HGB BLD-MCNC: 12.9 G/DL (ref 11.7–15.5)
LDLC SERPL CALC-MCNC: 152 MG/DL (CALC)
LYMPHOCYTES # BLD AUTO: 2952 CELLS/UL (ref 850–3900)
LYMPHOCYTES NFR BLD AUTO: 36 %
MAGNESIUM SERPL-MCNC: 2.2 MG/DL (ref 1.5–2.5)
MCH RBC QN AUTO: 29.5 PG (ref 27–33)
MCHC RBC AUTO-ENTMCNC: 32 G/DL (ref 32–36)
MCV RBC AUTO: 92 FL (ref 80–100)
MICROALBUMIN UR-MCNC: 1.2 MG/DL
MONOCYTES # BLD AUTO: 582 CELLS/UL (ref 200–950)
MONOCYTES NFR BLD AUTO: 7.1 %
NEUTROPHILS # BLD AUTO: 4535 CELLS/UL (ref 1500–7800)
NEUTROPHILS NFR BLD AUTO: 55.3 %
NONHDLC SERPL-MCNC: 173 MG/DL (CALC)
PLATELET # BLD AUTO: 321 THOUSAND/UL (ref 140–400)
PMV BLD REES-ECKER: 10.7 FL (ref 7.5–12.5)
POTASSIUM SERPL-SCNC: 4.4 MMOL/L (ref 3.5–5.3)
PROT SERPL-MCNC: 7.8 G/DL (ref 6.1–8.1)
RBC # BLD AUTO: 4.38 MILLION/UL (ref 3.8–5.1)
SODIUM SERPL-SCNC: 141 MMOL/L (ref 135–146)
TRIGL SERPL-MCNC: 97 MG/DL
TSH SERPL-ACNC: 1.16 MIU/L (ref 0.4–4.5)
WBC # BLD AUTO: 8.2 THOUSAND/UL (ref 3.8–10.8)

## 2025-04-15 RX ORDER — ERGOCALCIFEROL 1.25 MG/1
1.25 CAPSULE ORAL WEEKLY
Qty: 6 CAPSULE | Refills: 0 | Status: SHIPPED | OUTPATIENT
Start: 2025-04-15 | End: 2025-05-27

## 2025-04-15 NOTE — TELEPHONE ENCOUNTER
Called pt to update on lab results. A1c elevated to prediabetes range, pt has plan for diet and exercise changes. Vit D deficient, will send 50,000 every week for 6 weeks and then maintenance. Normal liver, kidney, electrolyte and thyroid studies. Elevated lipids but pt recently stopped statin. ASCVD risk 13.9% 10 year. Discussed with pt risks and pt agreeable to restart. Plan to get Ct calcium score. Pt questions answered, has 2 week nursing visit follow up for BP and 6 week MD follow up    Signed,  Tushar Duran MD PGY-3  Internal Medicine-Pediatrics

## 2025-04-18 ENCOUNTER — APPOINTMENT (OUTPATIENT)
Dept: RADIOLOGY | Facility: HOSPITAL | Age: 60
End: 2025-04-18
Payer: COMMERCIAL

## 2025-04-18 ENCOUNTER — HOSPITAL ENCOUNTER (OUTPATIENT)
Dept: RADIOLOGY | Facility: HOSPITAL | Age: 60
Discharge: HOME | End: 2025-04-18
Payer: COMMERCIAL

## 2025-04-18 DIAGNOSIS — Z00.00 HEALTHCARE MAINTENANCE: ICD-10-CM

## 2025-04-18 PROCEDURE — 77063 BREAST TOMOSYNTHESIS BI: CPT | Performed by: RADIOLOGY

## 2025-04-18 PROCEDURE — 77067 SCR MAMMO BI INCL CAD: CPT | Performed by: RADIOLOGY

## 2025-04-18 PROCEDURE — 77067 SCR MAMMO BI INCL CAD: CPT

## 2025-04-28 DIAGNOSIS — I10 PRIMARY HYPERTENSION: ICD-10-CM

## 2025-05-26 ASSESSMENT — DERMATOLOGY QUALITY OF LIFE (QOL) ASSESSMENT
RATE HOW BOTHERED YOU ARE BY SYMPTOMS OF YOUR SKIN PROBLEM (EG, ITCHING, STINGING BURNING, HURTING OR SKIN IRRITATION): 3
RATE HOW BOTHERED YOU ARE BY SYMPTOMS OF YOUR SKIN PROBLEM (EG, ITCHING, STINGING BURNING, HURTING OR SKIN IRRITATION): 3

## 2025-06-02 ENCOUNTER — OFFICE VISIT (OUTPATIENT)
Dept: DERMATOLOGY | Facility: HOSPITAL | Age: 60
End: 2025-06-02
Payer: COMMERCIAL

## 2025-06-02 DIAGNOSIS — L72.11 PILAR CYST: Primary | ICD-10-CM

## 2025-06-02 PROCEDURE — 1036F TOBACCO NON-USER: CPT | Performed by: DERMATOLOGY

## 2025-06-02 PROCEDURE — 99212 OFFICE O/P EST SF 10 MIN: CPT | Performed by: DERMATOLOGY

## 2025-06-02 PROCEDURE — 99202 OFFICE O/P NEW SF 15 MIN: CPT | Performed by: DERMATOLOGY

## 2025-06-02 ASSESSMENT — DERMATOLOGY QUALITY OF LIFE (QOL) ASSESSMENT
RATE HOW BOTHERED YOU ARE BY SYMPTOMS OF YOUR SKIN PROBLEM (EG, ITCHING, STINGING BURNING, HURTING OR SKIN IRRITATION): 0 - NEVER BOTHERED
RATE HOW EMOTIONALLY BOTHERED YOU ARE BY YOUR SKIN PROBLEM (FOR EXAMPLE, WORRY, EMBARRASSMENT, FRUSTRATION): 0 - NEVER BOTHERED
RATE HOW BOTHERED YOU ARE BY EFFECTS OF YOUR SKIN PROBLEMS ON YOUR ACTIVITIES (EG, GOING OUT, ACCOMPLISHING WHAT YOU WANT, WORK ACTIVITIES OR YOUR RELATIONSHIPS WITH OTHERS): 0 - NEVER BOTHERED
DATE THE QUALITY-OF-LIFE ASSESSMENT WAS COMPLETED: 67358
ARE THERE EXCLUSIONS OR EXCEPTIONS FOR THE QUALITY OF LIFE ASSESSMENT: NO

## 2025-06-02 ASSESSMENT — ITCH NUMERIC RATING SCALE: HOW SEVERE IS YOUR ITCHING?: 0

## 2025-06-02 ASSESSMENT — DERMATOLOGY PATIENT ASSESSMENT
ARE YOU TRYING TO GET PREGNANT: NO
ARE YOU AN ORGAN TRANSPLANT RECIPIENT: NO
DO YOU USE A TANNING BED: NO
HAVE YOU HAD OR DO YOU HAVE A STAPH INFECTION: NO
HAVE YOU HAD OR DO YOU HAVE VASCULAR DISEASE: NO
DO YOU HAVE IRREGULAR MENSTRUAL CYCLES: NO
DO YOU USE SUNSCREEN: OCCASIONALLY
DO YOU HAVE ANY NEW OR CHANGING LESIONS: NO
ARE YOU ON BIRTH CONTROL: NO

## 2025-06-02 ASSESSMENT — PATIENT GLOBAL ASSESSMENT (PGA): PATIENT GLOBAL ASSESSMENT: PATIENT GLOBAL ASSESSMENT:  1 - CLEAR

## 2025-06-02 NOTE — Clinical Note
-Discussed nature of the condition  - it is not painful, not growing, has been present and noticed for ~6 months  -Reassurance, recommend observation at this time    - Return to clinic if bleeding, pain, change in size/shape/color, or any other changes

## 2025-06-02 NOTE — PROGRESS NOTES
Subjective     Annabel Deng is a 60 y.o. female who presents for the following: Suspicious Skin Lesion (scalp). No prior derm care at . Referred by primary care Dr. Duran  4/14/25 for lump on head present 5-6 months    Intake Questions  Do you have any new or changing Lesions?: No  Are you an organ transplant recipient?: No  Have you had or do you have a Staph Infection?: No  Have you had or do you have Vacular Disease?: No  Do you use sunscreen?: Occasionally  Do you use a tanning bed?: No  Are you trying to get pregnant?: No  Are you on birth control?: No  Do you have irregular menstrual cycles?: No    Review of Systems:  No other skin or systemic complaints other than what is documented elsewhere in the note.    The following portions of the chart were reviewed this encounter and updated as appropriate:   Tobacco  Allergies  Meds  Problems  Med Hx  Surg Hx         Skin Cancer History  Biopsy Log Book  No skin cancers from Specimen Tracking.    Additional History      Specialty Problems    None       Objective   Well appearing patient in no apparent distress; mood and affect are within normal limits.    A focused skin examination was performed. All findings within normal limits unless otherwise noted below.    Assessment/Plan   Skin Exam  1. PILAR CYST  Mid Parietal Scalp  Subcutaneous nodule(s) with normal appearing overlying skin, 5 mm  -Discussed nature of the condition  - it is not painful, not growing, has been present and noticed for ~6 months  -Reassurance, recommend observation at this time    - Return to clinic if bleeding, pain, change in size/shape/color, or any other changes     Follow up as needed

## 2025-06-26 ENCOUNTER — TELEPHONE (OUTPATIENT)
Dept: PRIMARY CARE | Facility: HOSPITAL | Age: 60
End: 2025-06-26
Payer: COMMERCIAL